# Patient Record
Sex: MALE
[De-identification: names, ages, dates, MRNs, and addresses within clinical notes are randomized per-mention and may not be internally consistent; named-entity substitution may affect disease eponyms.]

---

## 2023-03-20 ENCOUNTER — HOSPITAL ENCOUNTER (OUTPATIENT)
Dept: HOSPITAL 95 - ORSCMMR | Age: 33
Discharge: HOME | End: 2023-03-20
Attending: INTERNAL MEDICINE
Payer: COMMERCIAL

## 2023-03-20 VITALS — BODY MASS INDEX: 23.8 KG/M2 | WEIGHT: 166.23 LBS | HEIGHT: 70 IN

## 2023-03-20 DIAGNOSIS — Z79.899: ICD-10-CM

## 2023-03-20 DIAGNOSIS — F17.210: ICD-10-CM

## 2023-03-20 DIAGNOSIS — K62.5: Primary | ICD-10-CM

## 2023-03-20 DIAGNOSIS — D12.0: ICD-10-CM

## 2023-03-20 PROCEDURE — 0DBH8ZX EXCISION OF CECUM, VIA NATURAL OR ARTIFICIAL OPENING ENDOSCOPIC, DIAGNOSTIC: ICD-10-PCS | Performed by: INTERNAL MEDICINE

## 2023-03-20 NOTE — NUR
03/20/23 0807 Racheal Hidalgo
HISTORY, CHART, MEDICATIONS AND ALLERGIES REVIEWED BEFORE START OF
PROCEDURE. PATIENT CONFIRMS NPO STATUS AND AGREES WITH SCHEDULED
PROCEDURE. 3-LEAD EKG REVIEWED WITH PHYSICIAN PRIOR TO START OF
PROCEDURE. MONITOR INTACT WITH CONTINUOUS PULSE OXIMETRY,CAPNOGRAPHY,
3-LEAD EKG, INTERMITTENT BP. SUPPLEMENTAL O2 TO BE TITRATED THROUGHOUT
PROCEDURE TO MAINTAIN O2 SATURATION ABOVE 90%. PATIENT DETERMINED TO
BE ASA APPROPRIATE FOR PROPOFOL SEDATION PRIOR TO START OF PROCEDURE
BY DR. NICHOLSON. MALLAMPATI CLASS 1 AIRWAY: COMPLETE VISULATIZATION OF
THE SOFT PALATE.

## 2023-03-20 NOTE — NUR
DISCHARGE SUMMARY
PT A&OX4, VSS/RA, ESTER PO, DRESSED SELF. DC INS PROVIDED. PT REP UNDERSTANDING
THOSE INSTRUCTIONS. IV DC'D. LEFT FLOOR VIA WC WITH DC VOLUNTEER TO GO HOME
WITH , WITH ALL PERSONAL POSSESSIONS.